# Patient Record
Sex: FEMALE | Race: WHITE | Employment: UNEMPLOYED | RURAL
[De-identification: names, ages, dates, MRNs, and addresses within clinical notes are randomized per-mention and may not be internally consistent; named-entity substitution may affect disease eponyms.]

---

## 2021-09-14 ENCOUNTER — OFFICE VISIT (OUTPATIENT)
Dept: FAMILY MEDICINE CLINIC | Age: 26
End: 2021-09-14
Payer: OTHER GOVERNMENT

## 2021-09-14 VITALS
WEIGHT: 168 LBS | TEMPERATURE: 97.7 F | SYSTOLIC BLOOD PRESSURE: 112 MMHG | OXYGEN SATURATION: 97 % | RESPIRATION RATE: 16 BRPM | DIASTOLIC BLOOD PRESSURE: 72 MMHG | HEIGHT: 63 IN | HEART RATE: 77 BPM | BODY MASS INDEX: 29.77 KG/M2

## 2021-09-14 DIAGNOSIS — Z76.0 ENCOUNTER FOR MEDICATION REFILL: Primary | ICD-10-CM

## 2021-09-14 PROCEDURE — 99203 OFFICE O/P NEW LOW 30 MIN: CPT | Performed by: STUDENT IN AN ORGANIZED HEALTH CARE EDUCATION/TRAINING PROGRAM

## 2021-09-14 RX ORDER — ETONOGESTREL AND ETHINYL ESTRADIOL 11.7; 2.7 MG/1; MG/1
INSERT, EXTENDED RELEASE VAGINAL
Qty: 5 RING | Refills: 0 | Status: SHIPPED | OUTPATIENT
Start: 2021-09-14

## 2021-09-14 NOTE — PROGRESS NOTES
1. Have you been to the ER, urgent care clinic since your last visit? Hospitalized since your last visit? No    2. Have you seen or consulted any other health care providers outside of the [address] [place] [address] since your last visit? Include any pap smears or colon screening. No    Reviewed record in preparation for visit and have necessary documentation  Goals that were addressed and/or need to be completed during or after this appointment include     Health Maintenance Due   Topic Date Due    Hepatitis C Screening  Never done    HPV Age 9Y-34Y (1 - 2-dose series) Never done    COVID-19 Vaccine (1) Never done    DTaP/Tdap/Td series (1 - Tdap) Never done    Pap Smear  Never done    Flu Vaccine (1) Never done       Patient is accompanied by self I have received verbal consent from [facility] to discuss any/all medical information while they are present in the room.

## 2021-09-14 NOTE — PROGRESS NOTES
Chief Complaint   Patient presents with    New Patient    Establish Care       History of Present Illness:  Pearl Meier is a 32 y.o. female who presents to clinic for refill of her Nuvaring. Pt reports that she needs a refill of her Nuvaring through December. Her  is finishing [employer] at [employer] in November, and they will be moving to [place]. She plans to establish care with a PCP at that time. She hasn't had any problems with using Nuvaring in the past, using one per month. She has not had a Nuvaring this month. She has no complaints at this time. She denies dysmenorrhea, fevers, chills, headache, nausea, vomiting, chest pain, or shortness of breath. Of note, she reports that she had a normal PAP in 2019. She reports good diet and regular exercise. She has been using sunscreen regularly. She declines the flu shot today. She has not received the COVID vaccine. PMHx: None    PSHx: Knee surgery in 2013 for ACL and meniscus    Meds: Nuvaring    Allergies: Amoxicillin or Penicillin (hives)    Social Hx: Drinks etoh socially occasionally, no smoking or illicit drug use. Family Hx: Father has HTN      Physical Exam:     Visit Vitals  /72 (BP 1 Location: Right arm, BP Patient Position: Sitting, BP Cuff Size: Adult)   Pulse 77   Temp 97.7 °F (36.5 °C) (Oral)   Resp 16   Ht 5' 3\" (1.6 m)   Wt 168 lb (76.2 kg)   LMP 09/03/2021 (Exact Date)   SpO2 97%   BMI 29.76 kg/m²       Physical Examination:  General: No acute distress  Head: Normocephalic, atraumatic  CV: Heart: regular rate  Resp: Breathing comfortably on room air  Neuro: Awake, alert  Psych: Appropriate mood and affect      Assessment/Plan:    Diagnoses and all orders for this visit:    1. Encounter for medication refill: Pt presents for refill of her Nuvaring. She does not currently have a Nuvaring this month. No hx of complication associated with use of Nuvaring. No hx of blood clot.   - Provided refill of Nuvaring, total of 5 rings to last until December per pt's request. She plans to establish care with a PCP in [place] at that time. - Counseled pt to start using ring on one of the first days of her period and to use back-up birth control (condom) for first 7 days. - Counseled on side effects/complications of hormonal contraceptives including nausea, vomiting, headache, mood changes, blood clots, and TSS (w/ Nuvaring). 2. Encounter to establish care: Pt reports that she plans to establish longer-term care with a PCP once she and her  move to [place]. Normal PAP in 2019. At today's visit, blood pressure wnl. Reports healthy diet and regular exercise. Using sunscreen. Declines flu shot at this time. - Encouraged pt to receive COVID vaccine. - She plans to establish regular care once she and her  move to [place] later this fall. Lakisha Stafford expressed understanding of this plan. An AVS was printed and given to the patient.      Case discussed with Dr. Marisol Harmon MD (attending)    Cande Marie MD  [facility] Medicine Resident

## 2021-09-14 NOTE — PATIENT INSTRUCTIONS
Ring for Birth Control: Care Instructions  Your Care Instructions     The ring is used to prevent pregnancy. It's a soft plastic ring that you put into your vagina. It's also called the vaginal ring. It gives you a regular dose of the hormones estrogen and progestin. The ring protects against pregnancy for 1 month at a time. You wear one ring for 3 weeks in a row and then go without a ring for 1 week. During this week, you have your period. Follow-up care is a key part of your treatment and safety. Be sure to make and go to all appointments, and call your doctor if you are having problems. It's also a good idea to know your test results and keep a list of the medicines you take. How can you care for yourself at home? How do you use the ring? · Talk to your doctor about the best day to start using the ring. Usually, a ring is started during one of the first 5 days of your period. Ask your doctor if you need to avoid intercourse or use backup birth control, such as a condom, for the first 7 days. · Insert the ring according to the package instructions. You can't put the ring in incorrectly. It works no matter what its position in the vagina is. · Note the day you put the ring in. Leave the ring in for 3 weeks. You don't have to remove the ring when you have intercourse. · When the 3 weeks are up, take the ring out on the same day of the week that you put it in. Don't use another ring for 7 days. You will have your period during these 7 days. · After the 7-day break, put in a new ring on the same day of the week that you did 4 weeks earlier. You may still be having your period. What if you forget to change the ring or it comes out? Always read the label for specific instructions, or call your doctor. Here are some basic guidelines:  · If you leave the ring in for more than 4 weeks, remove it. Put in a new ring.  Use backup birth control, such as a condom, or don't have intercourse until the new ring has been in place for 7 days. If you had intercourse, you can use emergency contraception to help prevent pregnancy. The most effective emergency contraception is the copper IUD (inserted by a doctor). You can also get emergency contraceptive pills without a prescription at most [facility]. · If a ring slips out and it is out of your vagina for less than 3 hours, you are still protected from pregnancy. The ring can be rinsed and reinserted. · If a ring is out of the vagina for more than 3 hours, you may not be protected from pregnancy. Rinse and reinsert the ring or put in a new one as soon as possible. Use backup birth control or don't have intercourse until a new ring has been in place for 7 days. If you had intercourse, you can use emergency contraception. What else do you need to know? · The ring can have side effects. ? You may have very light or skipped periods. ? You may have bleeding between periods (spotting). This usually decreases after 3 to 4 months. ? You may have mood changes, less interest in sex, or weight gain. ? You may have vaginal discharge or irritation of the vagina. · Check with your doctor before you use any other medicines, including over-the-counter medicines, vitamins, herbal products, and supplements. Birth control hormones may not work as well to prevent pregnancy when combined with other medicines. · The ring doesn't protect against sexually transmitted infections (STIs), such as herpes or HIV/AIDS. If you're not sure whether your sex partner might have an STI, use a condom to protect against disease. When should you call for help? Watch closely for changes in your health, and be sure to contact your doctor if you have any problems. Where can you learn more? Go to http://www.gray.com/  Enter X304 in the search box to learn more about \"Ring for Birth Control: Care Instructions. \"  Current as of: October 8, 2020               Content Version: 12.8  © 2016-2890 [employer]. Care instructions adapted under license by [employer] (which disclaims liability or warranty for this information). If you have questions about a medical condition or this instruction, always ask your healthcare professional. [address] any warranty or liability for your use of this information.

## 2023-01-17 ENCOUNTER — APPOINTMENT (RX ONLY)
Dept: URBAN - METROPOLITAN AREA OTHER 11 | Facility: OTHER | Age: 28
Setting detail: DERMATOLOGY
End: 2023-01-17

## 2023-01-17 DIAGNOSIS — B36.0 PITYRIASIS VERSICOLOR: ICD-10-CM

## 2023-01-17 DIAGNOSIS — L30.4 ERYTHEMA INTERTRIGO: ICD-10-CM

## 2023-01-17 PROCEDURE — ? COUNSELING

## 2023-01-17 PROCEDURE — 99203 OFFICE O/P NEW LOW 30 MIN: CPT

## 2023-01-17 PROCEDURE — ? PRESCRIPTION

## 2023-01-17 RX ORDER — TRIAMCINOLONE ACETONIDE 1 MG/G
CREAM TOPICAL
Qty: 80 | Refills: 11 | Status: ERX | COMMUNITY
Start: 2023-01-17

## 2023-01-17 RX ORDER — FLUCONAZOLE 200 MG/1
TABLET ORAL QD
Qty: 4 | Refills: 2 | Status: ERX | COMMUNITY
Start: 2023-01-17

## 2023-01-17 RX ADMIN — TRIAMCINOLONE ACETONIDE: 1 CREAM TOPICAL at 00:00

## 2023-01-17 RX ADMIN — FLUCONAZOLE: 200 TABLET ORAL at 00:00

## 2023-01-17 ASSESSMENT — LOCATION ZONE DERM
LOCATION ZONE: ARM
LOCATION ZONE: TRUNK

## 2023-01-17 ASSESSMENT — LOCATION SIMPLE DESCRIPTION DERM
LOCATION SIMPLE: CHEST
LOCATION SIMPLE: ABDOMEN
LOCATION SIMPLE: LEFT SHOULDER

## 2023-01-17 ASSESSMENT — LOCATION DETAILED DESCRIPTION DERM
LOCATION DETAILED: LEFT POSTERIOR SHOULDER
LOCATION DETAILED: LEFT MEDIAL SUPERIOR CHEST
LOCATION DETAILED: LEFT RIB CAGE

## 2023-05-14 RX ORDER — ETONOGESTREL AND ETHINYL ESTRADIOL 11.7; 2.7 MG/1; MG/1
INSERT, EXTENDED RELEASE VAGINAL
COMMUNITY
Start: 2021-09-14

## 2023-07-03 ENCOUNTER — APPOINTMENT (RX ONLY)
Dept: URBAN - METROPOLITAN AREA OTHER 11 | Facility: OTHER | Age: 28
Setting detail: DERMATOLOGY
End: 2023-07-03

## 2023-07-03 DIAGNOSIS — L74.51 PRIMARY FOCAL HYPERHIDROSIS: ICD-10-CM

## 2023-07-03 DIAGNOSIS — B36.0 PITYRIASIS VERSICOLOR: ICD-10-CM

## 2023-07-03 PROBLEM — L74.519 PRIMARY FOCAL HYPERHIDROSIS, UNSPECIFIED: Status: ACTIVE | Noted: 2023-07-03

## 2023-07-03 PROCEDURE — 99213 OFFICE O/P EST LOW 20 MIN: CPT

## 2023-07-03 PROCEDURE — ? PRESCRIPTION

## 2023-07-03 PROCEDURE — ? COUNSELING

## 2023-07-03 PROCEDURE — ? OTHER

## 2023-07-03 RX ORDER — FLUCONAZOLE 200 MG/1
1 TABLET ORAL
Qty: 16 | Refills: 3 | Status: ERX

## 2023-07-03 ASSESSMENT — LOCATION DETAILED DESCRIPTION DERM
LOCATION DETAILED: SUPERIOR THORACIC SPINE
LOCATION DETAILED: RIGHT MEDIAL UPPER BACK
LOCATION DETAILED: SUBXIPHOID

## 2023-07-03 ASSESSMENT — LOCATION ZONE DERM: LOCATION ZONE: TRUNK

## 2023-07-03 ASSESSMENT — LOCATION SIMPLE DESCRIPTION DERM
LOCATION SIMPLE: UPPER BACK
LOCATION SIMPLE: RIGHT UPPER BACK
LOCATION SIMPLE: ABDOMEN

## 2023-07-03 NOTE — PROCEDURE: OTHER
Render Risk Assessment In Note?: no
Note Text (......Xxx Chief Complaint.): This diagnosis correlates with the
Detail Level: Zone
Other (Free Text): Ordered CXR PA/Lat to r/o Hilar LAD
Other (Free Text): I explained to pt this recurrent rash is due to her night sweats which need to be evaluated by her PCP. For now will try every other week fluconazole and head and shoulders shampoo

## 2023-07-03 NOTE — PROCEDURE: COUNSELING
Detail Level: Zone
Patient Specific Counseling (Will Not Stick From Patient To Patient): I advised pt to see her PCP ASAP regarding this complaint, to ensure it's not due to an endocrine issue or malignancy. Will order CXR, but it sounds like recent lab work has been normal, and her PCP plans to order more during her upcoming visit.